# Patient Record
Sex: MALE | Race: BLACK OR AFRICAN AMERICAN
[De-identification: names, ages, dates, MRNs, and addresses within clinical notes are randomized per-mention and may not be internally consistent; named-entity substitution may affect disease eponyms.]

---

## 2023-04-12 ENCOUNTER — HOSPITAL ENCOUNTER (EMERGENCY)
Dept: HOSPITAL 75 - ER | Age: 28
Discharge: HOME | End: 2023-04-12
Payer: MEDICARE

## 2023-04-12 VITALS — BODY MASS INDEX: 17.58 KG/M2 | HEIGHT: 67.72 IN | WEIGHT: 114.64 LBS

## 2023-04-12 VITALS — DIASTOLIC BLOOD PRESSURE: 92 MMHG | SYSTOLIC BLOOD PRESSURE: 136 MMHG

## 2023-04-12 DIAGNOSIS — Z28.310: ICD-10-CM

## 2023-04-12 DIAGNOSIS — M79.89: ICD-10-CM

## 2023-04-12 DIAGNOSIS — D72.829: ICD-10-CM

## 2023-04-12 DIAGNOSIS — R94.6: ICD-10-CM

## 2023-04-12 DIAGNOSIS — R53.1: Primary | ICD-10-CM

## 2023-04-12 LAB
ALBUMIN SERPL-MCNC: 3.3 GM/DL (ref 3.2–4.5)
ALP SERPL-CCNC: 78 U/L (ref 40–136)
ALT SERPL-CCNC: 19 U/L (ref 0–55)
BASOPHILS # BLD AUTO: 0 10^3/UL (ref 0–0.1)
BASOPHILS NFR BLD AUTO: 0 % (ref 0–10)
BILIRUB SERPL-MCNC: 0.7 MG/DL (ref 0.1–1)
BLD SMEAR INTERP: YES
BUN/CREAT SERPL: 47
CALCIUM SERPL-MCNC: 9.8 MG/DL (ref 8.5–10.1)
CHLORIDE SERPL-SCNC: 101 MMOL/L (ref 98–107)
CO2 SERPL-SCNC: 30 MMOL/L (ref 21–32)
CREAT SERPL-MCNC: 1.02 MG/DL (ref 0.6–1.3)
EOSINOPHIL # BLD AUTO: 0.1 10^3/UL (ref 0–0.3)
EOSINOPHIL NFR BLD AUTO: 1 % (ref 0–10)
GFR SERPLBLD BASED ON 1.73 SQ M-ARVRAT: 103 ML/MIN
GLUCOSE SERPL-MCNC: 67 MG/DL (ref 70–105)
HCT VFR BLD CALC: 42 % (ref 40–54)
HGB BLD-MCNC: 13.6 G/DL (ref 13.3–17.7)
LYMPHOCYTES # BLD AUTO: 1.2 10^3/UL (ref 1–4)
LYMPHOCYTES NFR BLD AUTO: 11 % (ref 12–44)
MAGNESIUM SERPL-MCNC: 2 MG/DL (ref 1.6–2.4)
MANUAL DIFFERENTIAL PERFORMED BLD QL: NO
MCH RBC QN AUTO: 28 PG (ref 25–34)
MCHC RBC AUTO-ENTMCNC: 33 G/DL (ref 32–36)
MCV RBC AUTO: 86 FL (ref 80–99)
MONOCYTES # BLD AUTO: 0.5 10^3/UL (ref 0–1)
MONOCYTES NFR BLD AUTO: 4 % (ref 0–12)
NEUTROPHILS # BLD AUTO: 9.3 10^3/UL (ref 1.8–7.8)
NEUTROPHILS NFR BLD AUTO: 84 % (ref 42–75)
PLATELET # BLD: 114 10^3/UL (ref 130–400)
PMV BLD AUTO: 11.2 FL (ref 9–12.2)
POTASSIUM SERPL-SCNC: 3.8 MMOL/L (ref 3.6–5)
PROT SERPL-MCNC: 7.3 GM/DL (ref 6.4–8.2)
SODIUM SERPL-SCNC: 144 MMOL/L (ref 135–145)
VALPROATE SERPL-MCNC: 54.9 UG/ML (ref 50–100)
WBC # BLD AUTO: 11.1 10^3/UL (ref 4.3–11)

## 2023-04-12 PROCEDURE — 80053 COMPREHEN METABOLIC PANEL: CPT

## 2023-04-12 PROCEDURE — 71045 X-RAY EXAM CHEST 1 VIEW: CPT

## 2023-04-12 PROCEDURE — 86141 C-REACTIVE PROTEIN HS: CPT

## 2023-04-12 PROCEDURE — 80164 ASSAY DIPROPYLACETIC ACD TOT: CPT

## 2023-04-12 PROCEDURE — 82947 ASSAY GLUCOSE BLOOD QUANT: CPT

## 2023-04-12 PROCEDURE — 85025 COMPLETE CBC W/AUTO DIFF WBC: CPT

## 2023-04-12 PROCEDURE — 84484 ASSAY OF TROPONIN QUANT: CPT

## 2023-04-12 PROCEDURE — 93005 ELECTROCARDIOGRAM TRACING: CPT

## 2023-04-12 PROCEDURE — 83735 ASSAY OF MAGNESIUM: CPT

## 2023-04-12 PROCEDURE — 84443 ASSAY THYROID STIM HORMONE: CPT

## 2023-04-12 PROCEDURE — 36415 COLL VENOUS BLD VENIPUNCTURE: CPT

## 2023-04-12 PROCEDURE — 83880 ASSAY OF NATRIURETIC PEPTIDE: CPT

## 2023-04-12 NOTE — ED GENERAL
General


Chief Complaint:  General Problems/Pain


Stated Complaint:  DEHYDRATION


Nursing Triage Note:  


PT IS A [employer] [facility] PT FROM [place].  WORKER WITH HIM STATES HE IS NOT 


EATING OR DRINKING AND HAS HAD A 60# WT LOSS IN THE LAST SEVERAL MONTHS.  THEY 


HAVE TAKEN HIM TO SEVERAL DR'S WHO TELL THEM THERE IS NOTHING WRONG.


Source of Information:  Caregiver


Exam Limitations:  No Limitations





History of Present Illness


Date Seen by Provider:  Apr 12, 2023


Time Seen by Provider:  11:13


Initial Comments


Patient is a 27-year-old male with a history of of MR, mood disorder, episodic 

mood disorder, intermittent explosive disorder, anxiety disorder, 

schizoaffective disorder who presents ED with caregiver at the group home from 

[facility] in [place] for decreased appetite, weight pound loss over the past 

2 to 3 months.  According to caregiver patient has had a 60 pound weight loss 

over the past few months.  States patient had oral cancer in 2018 and currently 

in remission.  In February they scanned his neck, chest and abdomen which was 

unremarkable.  Has seen his primary care physician Dr. Forman at [place].  She 

reports bilateral leg and feet swelling over the past several months.  Concerned

due to his mental health he is not wanting to eat.  Patient did urinate today.  

Patient was complaining of chest pain before arrival.  Mild cough today.  Denies

vomiting, diarrhea, change in urination.  Difficulty obtaining history from 

patient all information was obtained from caregiver at bedside.  Caregiver 

states patient is lethargic.





Allergies and Home Medications


Allergies


Coded Allergies:  


     phentermine (Verified  Allergy, Unknown, 4/12/23)





Patient Home Medication List


Home Medication List Reviewed:  Yes





Review of Systems


Review of Systems


Constitutional:  No chills, No diaphoresis, No malaise, No weakness


EENTM:  No blurred vision, No double vision


Respiratory:  No cough, No dyspnea on exertion, No short of breath


Cardiovascular:  No chest pain


Gastrointestinal:  No abdominal pain, No diarrhea, No nausea, No vomiting


Genitourinary:  No decreased output, No discharge


Musculoskeletal:  No back pain, No gout


Skin:  No change in color


Psychiatric/Neurological:  Denies Headache, Denies Numbness





All Other Systems Reviewed


Negative Unless Noted:  Yes





Past Medical-Social-Family Hx


Patient Social History


Tobacco Use?:  No


Use of E-Cig and/or Vaping dev:  No


Substance use?:  No





Physical Exam


Vital Signs





Vital Signs - First Documented








 4/12/23





 10:47


 


Temp 36.3


 


Pulse 62


 


Resp 16


 


B/P (MAP) 132/103 (113)


 


Pulse Ox 94


 


O2 Delivery Room Air





Capillary Refill : Less Than 3 Seconds


Height, Weight, BMI


Height: '"


Weight: lbs. oz. kg; 17.00 BMI


Method:


General Appearance:  Thin


Eyes:  Bilateral Eye Normal Inspection, Bilateral Eye PERRL, Bilateral Eye EOMI


HEENT:  PERRL/EOMI, TMs Normal, Normal ENT Inspection, Pharynx Normal


Neck:  Full Range of Motion, Normal Inspection, Non Tender, Supple


Respiratory:  Chest Non Tender, Lungs Clear, Normal Breath Sounds, No Accessory 

Muscle Use


Cardiovascular:  Regular Rate, Rhythm, No Edema, No Gallop, No JVD


Gastrointestinal:  Normal Bowel Sounds, No Organomegaly, No Pulsatile Mass, Non 

Tender


Back:  Normal Inspection, No CVA Tenderness, No Vertebral Tenderness


Extremity:  Normal Capillary Refill, Normal Inspection, Normal Range of Motion, 

Swelling (Swelling bilateral dorsum hands and feet.)


Neurologic/Psychiatric:  Aphasia, Disoriented





Progress/Results/Core Measures


Suspected Sepsis


SIRS


Temperature: 


Pulse: 62 


Respiratory Rate: 16


 


Laboratory Tests


4/12/23 11:40: White Blood Count 11.1H


Blood Pressure 132 /103 


Mean: 113


 





Laboratory Tests


4/12/23 11:40: 


Creatinine 1.02, Platelet Count 114L, Total Bilirubin 0.7








Results/Orders


Lab Results





Laboratory Tests








Test


 4/12/23


11:40 4/12/23


13:38 4/12/23


13:51 Range/Units


 


 


White Blood Count


 11.1 H


 


 


 4.3-11.0


10^3/uL


 


Red Blood Count


 4.83 


 


 


 4.30-5.52


10^6/uL


 


Hemoglobin 13.6    13.3-17.7  g/dL


 


Hematocrit 42    40-54  %


 


Mean Corpuscular Volume 86    80-99  fL


 


Mean Corpuscular Hemoglobin 28    25-34  pg


 


Mean Corpuscular Hemoglobin


Concent 33 


 


 


 32-36  g/dL





 


Red Cell Distribution Width 14.6 H   10.0-14.5  %


 


Platelet Count


 114 L


 


 


 130-400


10^3/uL


 


Mean Platelet Volume 11.2    9.0-12.2  fL


 


Immature Granulocyte % (Auto) 0     %


 


Neutrophils (%) (Auto) 84 H   42-75  %


 


Lymphocytes (%) (Auto) 11 L   12-44  %


 


Monocytes (%) (Auto) 4    0-12  %


 


Eosinophils (%) (Auto) 1    0-10  %


 


Basophils (%) (Auto) 0    0-10  %


 


Neutrophils # (Auto)


 9.3 H


 


 


 1.8-7.8


10^3/uL


 


Lymphocytes # (Auto)


 1.2 


 


 


 1.0-4.0


10^3/uL


 


Monocytes # (Auto)


 0.5 


 


 


 0.0-1.0


10^3/uL


 


Eosinophils # (Auto)


 0.1 


 


 


 0.0-0.3


10^3/uL


 


Basophils # (Auto)


 0.0 


 


 


 0.0-0.1


10^3/uL


 


Immature Granulocyte # (Auto)


 0.0 


 


 


 0.0-0.1


10^3/uL


 


Percent Immature Platelet


Fraction 7.8 H


 


 


 0.0-7.6  %





 


Sodium Level 144    135-145  MMOL/L


 


Potassium Level 3.8    3.6-5.0  MMOL/L


 


Chloride Level 101      MMOL/L


 


Carbon Dioxide Level 30    21-32  MMOL/L


 


Anion Gap 13    5-14  MMOL/L


 


Blood Urea Nitrogen 48 H   7-18  MG/DL


 


Creatinine


 1.02 


 


 


 0.60-1.30


MG/DL


 


Estimat Glomerular Filtration


Rate 103 


 


 


  





 


BUN/Creatinine Ratio 47     


 


Glucose Level 67 L     MG/DL


 


Calcium Level 9.8    8.5-10.1  MG/DL


 


Corrected Calcium 10.4 H   8.5-10.1  MG/DL


 


Magnesium Level 2.0    1.6-2.4  MG/DL


 


Total Bilirubin 0.7    0.1-1.0  MG/DL


 


Aspartate Amino Transf


(AST/SGOT) 25 


 


 


 5-34  U/L





 


Alanine Aminotransferase


(ALT/SGPT) 19 


 


 


 0-55  U/L





 


Alkaline Phosphatase 78      U/L


 


Troponin I < 0.028    <0.028  NG/ML


 


C-Reactive Protein High


Sensitivity 7.68 H


 


 


 0.00-0.50


MG/DL


 


B-Type Natriuretic Peptide 20.3    <100.0  PG/ML


 


Total Protein 7.3    6.4-8.2  GM/DL


 


Albumin 3.3    3.2-4.5  GM/DL


 


Thyroid Stimulating Hormone


(TSH) 7.79 H


 


 


 0.35-4.94


UIU/ML


 


Valproic Acid (Depakene) Level


 54.9 


 


 


 50.0-100.0


UG/ML


 


Smear Scan YES     


 


Glucometer  72  77    MG/DL








My Orders





Orders - LARISA TIWARI


Cbc With Automated Diff (4/12/23 11:11)


Comprehensive Metabolic Panel (4/12/23 11:11)


Bnp Homa (4/12/23 11:11)


Thyroid Stimulating Hormone (4/12/23 11:11)


Valproic Acid (4/12/23 11:11)


Ekg Tracing (4/12/23 11:11)


Troponin I Homa (4/12/23 11:11)


Magnesium (4/12/23 11:11)


Hs C Reactive Protein (4/12/23 11:11)


Chest 1 View, Ap/Pa Only (4/12/23 11:22)


Ed Iv/Invasive Line Start (4/12/23 11:48)


Accucheck Stat ONCE (4/12/23 13:28)





Vital Signs/I&O











 4/12/23 4/12/23





 10:47 14:27


 


Temp 36.3 


 


Pulse 62 60


 


Resp 16 14


 


B/P (MAP) 132/103 (113) 136/92


 


Pulse Ox 94 96


 


O2 Delivery Room Air Room Air





Capillary Refill : Less Than 3 Seconds








Blood Pressure Mean:                    113











ECG


Comment


Sinus rhythm, 52 bpm, QRS duration 87 MS,  QTc 552 MS.





Departure


Communication (PCP)


No previous ER visits.  Patient here with .  Patient is a resident at

a group home in [place].  Concerning for 60 pound weight loss over the past few 

months.  Not eating at home or drinking as much.  Patient with several mental 

health disorders  and on several different medications.  Currently on Depakote, 

clonazepam, Haldol, Invega for his mental health.  Patiently recently placed on 

levothyroxine 6 weeks ago 50 mcg secondary to hypothyroidism.  According to 

staff patient was complaining of chest pain today.  Difficulty obtaining history

from patient.  Patient able to verbally respond but hard to understand.  patient

has been urinating.  No known fever.  No vomiting or diarrhea or cough.  Patient

does have a thin habitus.  Due to current complaint CBC, CMP, urinalysis, chest 

x-ray, EKG, cardiac work-up, Depakote level was ordered.  Depakote level 53 

within range.  CBC showed white blood count 11.1, platelets 114, chemistry 

glucose 67, normal kidney function and liver function.  CRP 7.  TSH is 7.76. I 

Was not able to obtain urinalysis.  Patient does urinate on own but did not 

provide urine.  Patient was given a liter of fluid.  After receiving patient's 

blood sugar patient ate a full meal at bedside.  TSH high at 7.76.  Discussed 

increasing levothyroxine to 100 mcg.  Recommend recheck in the next week with 

primary care physician.  White blood count slightly elevated nonspecific.  No 

evidence suggest infection, however not able to obtain a urine.  According to 

caregiver patient has been urinating just fine.  Would be reasonable to get o

utpatient urinalysis but patient symptoms have been ongoing for the past 2 to 3 

months.  History of esophageal cancer and had a CT scan of his neck, chest and 

abdomen and pelvis performed a few months ago by his oncologist which was 

unremarkable according to caregiver.  I am concerned that patient is not wanting

to eat at home.  My concern likely secondary to his mental health, medications. 

I do feel patient would likely benefit with a feeding tube which caregiver does 

agree.  Discussed these results with her primary care physician Jhon Lainez at 

[facility] who states he will establish a follow-up with their general 

surgery at [facility].  Did offer resources to our general surgeon Dr. Briceno.  Discussed protein supplements such as boost.  Discussed importance of 

hydration and eating.  Patient Depakote level 54.  Does not necessarily need 

admission but strongly recommend pushing intake.





Impression





   Primary Impression:  


   Weakness


   Additional Impression:  


   Elevated TSH


Disposition:  01 HOME, SELF-CARE


Condition:  Stable





Departure-Patient Inst.


Decision time for Depature:  14:19


Referrals:  


JN LAINEZ NP (PCP/Family)


Primary Care Physician


Patient Instructions:  Weakness ED





Add. Discharge Instructions:  


Need to follow-up with your primary care physician been to this discussed 

getting a feeding tube.  Continue eating at home.  Recommend protein 

supplements, Ensure.  Recommend increasing your levothyroxine to 100 mcg.





All discharge instructions reviewed with patient and/or family. Voiced 

understanding.











LARISA TIWARI          Apr 12, 2023 11:16

## 2023-04-12 NOTE — DIAGNOSTIC IMAGING REPORT
INDICATION: cough



COMPARISON: None



FINDINGS: Single frontal view of the chest demonstrates normal

heart size and pulmonary vascularity. The lungs are well aerated

and clear. No large pleural effusion or pneumothorax is seen. The

visualized osseous structures show no acute abnormalities.



IMPRESSION: 

1. No acute cardiopulmonary process.  



Dictated by: 



  Dictated on workstation # [id number]